# Patient Record
Sex: FEMALE | Race: WHITE | URBAN - METROPOLITAN AREA
[De-identification: names, ages, dates, MRNs, and addresses within clinical notes are randomized per-mention and may not be internally consistent; named-entity substitution may affect disease eponyms.]

---

## 2018-07-20 ENCOUNTER — OFFICE VISIT (OUTPATIENT)
Dept: FAMILY MEDICINE CLINIC | Age: 48
End: 2018-07-20

## 2018-07-20 VITALS
WEIGHT: 123.13 LBS | RESPIRATION RATE: 16 BRPM | BODY MASS INDEX: 19.33 KG/M2 | HEART RATE: 81 BPM | HEIGHT: 67 IN | SYSTOLIC BLOOD PRESSURE: 98 MMHG | TEMPERATURE: 97.7 F | OXYGEN SATURATION: 98 % | DIASTOLIC BLOOD PRESSURE: 62 MMHG

## 2018-07-20 DIAGNOSIS — L71.9 ROSACEA: Primary | ICD-10-CM

## 2018-07-20 RX ORDER — CLINDAMYCIN PHOSPHATE 11.9 MG/ML
SOLUTION TOPICAL
Qty: 60 ML | Refills: 2 | Status: SHIPPED | OUTPATIENT
Start: 2018-07-20 | End: 2022-10-28

## 2018-07-20 NOTE — PROGRESS NOTES
1. Have you been to the ER, urgent care clinic since your last visit? Hospitalized since your last visit? No    2. Have you seen or consulted any other health care providers outside of the [address] [place] [address] since your last visit? Include any pap smears or colon screening. No    Chief Complaint   Patient presents with    Establish Care     re-establish care    Rash     on face x 4 mo     Pt states she has a rash on her face x 4 mo.

## 2018-07-20 NOTE — MR AVS SNAPSHOT
[address] [address] 
[phone] Patient: Mayte Melvin MRN: [id number] [id number] Visit Information Date & Time Provider Department Dept. Phone Encounter #  
 7/20/2018  2:00 PM Kiesha Sanchez, [address] [phone] [id number] Upcoming Health Maintenance Date Due DTaP/Tdap/Td series (1 - Tdap) 3/29/1991 PAP AKA CERVICAL CYTOLOGY 3/29/1991 Influenza Age 5 to Adult 8/1/2018 Allergies as of 7/20/2018  Review Complete On: 7/20/2018 By: Marquis Dawkins LPN No Known Allergies Current Immunizations  Never Reviewed No immunizations on file. Not reviewed this visit You Were Diagnosed With   
  
 Codes Comments Rosacea    -  Primary ICD-10-CM: L71.9 ICD-9-CM: 695.3 Vitals BP Pulse Temp Resp Height(growth percentile) Weight(growth percentile) 98/62 81 97.7 °F (36.5 °C) (Oral) 16 5' 7\" (1.702 m) 123 lb 2 oz (55.8 kg) LMP SpO2 BMI OB Status Smoking Status 07/06/2018 (Approximate) 98% 19.28 kg/m2 Having regular periods Former Smoker Vitals History BMI and BSA Data Body Mass Index Body Surface Area  
 19.28 kg/m 2 1.62 m 2 Preferred Pharmacy Pharmacy Name Phone [id number], [address] [facility] [phone] Your Updated Medication List  
  
   
This list is accurate as of 7/20/18  2:35 PM.  Always use your most recent med list.  
  
  
  
  
 clindamycin 1 % external solution Commonly known as:  CLEOCIN T  
use thin film on affected area Prescriptions Sent to Pharmacy Refills  
 clindamycin (CLEOCIN T) 1 % external solution 2 Sig: use thin film on affected area Class: Normal  
 Pharmacy: [facility] [address], [address] Ph #: [phone] Introducing [facility]!    
 Niurka Schmidt introduces [employer] patient portal. Now you can access parts of your medical record, email your doctor's office, and request medication refills online. 1. In your internet browser, go to [email or web address] 2. Click on the First Time User? Click Here link in the Sign In box. You will see the New Member Sign Up page. 3. Enter your Workspace Access Code exactly as it appears below. You will not need to use this code after youve completed the sign-up process. If you do not sign up before the expiration date, you must request a new code. · Workspace Access Code: [email or web address] Expires: 10/18/2018  2:35 PM 
 
4. Enter the last four digits of your Social Security Number (xxxx) and Date of Birth (mm/dd/yyyy) as indicated and click Submit. You will be taken to the next sign-up page. 5. Create a Workspace ID. This will be your Workspace login ID and cannot be changed, so think of one that is secure and easy to remember. 6. Create a Workspace password. You can change your password at any time. 7. Enter your Password Reset Question and Answer. This can be used at a later time if you forget your password. 8. Enter your e-mail address. You will receive e-mail notification when new information is available in [address] 9. Click Sign Up. You can now view and download portions of your medical record. 10. Click the Download Summary menu link to download a portable copy of your medical information. If you have questions, please visit the Frequently Asked Questions section of the Workspace website. Remember, Workspace is NOT to be used for urgent needs. For medical emergencies, dial 911. Now available from your iPhone and Android! Please provide this summary of care documentation to your next provider. If you have any questions after today's visit, please call [phone].

## 2018-07-26 NOTE — PROGRESS NOTES
HISTORY OF PRESENT ILLNESS  Rain Steiner is a 50 y.o. female. HPI  Patient complains of red rash across the nose and cheeks  No change in lotions/moisturizers  Irritated and burns at times  The FamHx, SocHx, MedHx, Medication, and Allergy lists have been reviewed and updated in the chart. ROS  A comprehensive review of system was obtained and negative except findings in the HPI    Visit Vitals    BP 98/62    Pulse 81    Temp 97.7 °F (36.5 °C) (Oral)    Resp 16    Ht 5' 7\" (1.702 m)    Wt 123 lb 2 oz (55.8 kg)    LMP 07/06/2018 (Approximate)    SpO2 98%    BMI 19.28 kg/m2     Physical Exam   Constitutional: She is oriented to person, place, and time. She appears well-developed and well-nourished. Neck: No JVD present. Cardiovascular: Normal rate, regular rhythm and intact distal pulses. Exam reveals no gallop and no friction rub. No murmur heard. Pulmonary/Chest: Effort normal and breath sounds normal. No respiratory distress. She has no wheezes. Musculoskeletal: She exhibits no edema. Neurological: She is alert and oriented to person, place, and time. Skin: Skin is warm. Rash noted. Rosacea type rash of the face across nose and cheeks louie   Nursing note and vitals reviewed. ASSESSMENT and PLAN  Encounter Diagnoses   Name Primary?  Rosacea Yes     Orders Placed This Encounter    clindamycin (CLEOCIN T) 1 % external solution     Given cleocin 1% to use at night to face every other day and gradually increase use  Reviewed adr/se of med  Recheck 6 weeks    I have discussed the diagnosis with the patient and the intended plan as seen in the above orders. The patient has received an after-visit summary and questions were answered concerning future plans. Patient conveyed understanding of the plan at the time of the visit.     Hoda Greene, MSN, ANP  7/26/2018

## 2022-10-28 ENCOUNTER — OFFICE VISIT (OUTPATIENT)
Dept: FAMILY MEDICINE CLINIC | Age: 52
End: 2022-10-28
Payer: COMMERCIAL

## 2022-10-28 VITALS
TEMPERATURE: 98.3 F | SYSTOLIC BLOOD PRESSURE: 127 MMHG | HEIGHT: 67 IN | OXYGEN SATURATION: 98 % | BODY MASS INDEX: 20.37 KG/M2 | WEIGHT: 129.8 LBS | DIASTOLIC BLOOD PRESSURE: 74 MMHG | HEART RATE: 67 BPM

## 2022-10-28 DIAGNOSIS — Z11.59 ENCOUNTER FOR HEPATITIS C SCREENING TEST FOR LOW RISK PATIENT: ICD-10-CM

## 2022-10-28 DIAGNOSIS — L71.9 ROSACEA: Primary | ICD-10-CM

## 2022-10-28 DIAGNOSIS — Z83.49 FAMILY HISTORY OF THYROID DISEASE: ICD-10-CM

## 2022-10-28 LAB
ALBUMIN SERPL-MCNC: 3.9 G/DL (ref 3.5–5)
ALBUMIN/GLOB SERPL: 1.3 {RATIO} (ref 1.1–2.2)
ALP SERPL-CCNC: 76 U/L (ref 45–117)
ALT SERPL-CCNC: 19 U/L (ref 12–78)
ANION GAP SERPL CALC-SCNC: 3 MMOL/L (ref 5–15)
AST SERPL-CCNC: 11 U/L (ref 15–37)
BILIRUB SERPL-MCNC: 0.4 MG/DL (ref 0.2–1)
BUN SERPL-MCNC: 11 MG/DL (ref 6–20)
BUN/CREAT SERPL: 15 (ref 12–20)
CALCIUM SERPL-MCNC: 9.1 MG/DL (ref 8.5–10.1)
CHLORIDE SERPL-SCNC: 106 MMOL/L (ref 97–108)
CO2 SERPL-SCNC: 30 MMOL/L (ref 21–32)
CREAT SERPL-MCNC: 0.74 MG/DL (ref 0.55–1.02)
ERYTHROCYTE [DISTWIDTH] IN BLOOD BY AUTOMATED COUNT: 12.2 % (ref 11.5–14.5)
GLOBULIN SER CALC-MCNC: 2.9 G/DL (ref 2–4)
GLUCOSE SERPL-MCNC: 92 MG/DL (ref 65–100)
HCT VFR BLD AUTO: 44.6 % (ref 35–47)
HCV AB SERPL QL IA: NONREACTIVE
HGB BLD-MCNC: 14.6 G/DL (ref 11.5–16)
MCH RBC QN AUTO: 32 PG (ref 26–34)
MCHC RBC AUTO-ENTMCNC: 32.7 G/DL (ref 30–36.5)
MCV RBC AUTO: 97.8 FL (ref 80–99)
NRBC # BLD: 0 K/UL (ref 0–0.01)
NRBC BLD-RTO: 0 PER 100 WBC
PLATELET # BLD AUTO: 245 K/UL (ref 150–400)
PMV BLD AUTO: 10.5 FL (ref 8.9–12.9)
POTASSIUM SERPL-SCNC: 4.3 MMOL/L (ref 3.5–5.1)
PROT SERPL-MCNC: 6.8 G/DL (ref 6.4–8.2)
RBC # BLD AUTO: 4.56 M/UL (ref 3.8–5.2)
SODIUM SERPL-SCNC: 139 MMOL/L (ref 136–145)
TSH SERPL DL<=0.05 MIU/L-ACNC: 0.91 UIU/ML (ref 0.36–3.74)
WBC # BLD AUTO: 6.5 K/UL (ref 3.6–11)

## 2022-10-28 PROCEDURE — 99203 OFFICE O/P NEW LOW 30 MIN: CPT | Performed by: STUDENT IN AN ORGANIZED HEALTH CARE EDUCATION/TRAINING PROGRAM

## 2022-10-28 RX ORDER — CLINDAMYCIN PHOSPHATE 11.9 MG/ML
SOLUTION TOPICAL
Qty: 60 ML | Refills: 2 | Status: SHIPPED | OUTPATIENT
Start: 2022-10-28

## 2022-10-28 RX ORDER — METRONIDAZOLE 7.5 MG/G
CREAM TOPICAL 2 TIMES DAILY
COMMUNITY
End: 2022-10-28 | Stop reason: SDUPTHER

## 2022-10-28 RX ORDER — PIMECROLIMUS 10 MG/G
CREAM TOPICAL 2 TIMES DAILY
COMMUNITY
End: 2022-10-28 | Stop reason: SDUPTHER

## 2022-10-28 RX ORDER — PIMECROLIMUS 10 MG/G
CREAM TOPICAL 2 TIMES DAILY
Qty: 30 G | Refills: 0 | Status: SHIPPED | OUTPATIENT
Start: 2022-10-28

## 2022-10-28 RX ORDER — METRONIDAZOLE 7.5 MG/G
CREAM TOPICAL 2 TIMES DAILY
Qty: 45 G | Refills: 0 | Status: SHIPPED | OUTPATIENT
Start: 2022-10-28

## 2022-10-28 NOTE — PROGRESS NOTES
Jameson Beltre is a 46 y.o. female , id x 2(name and ). Reviewed record, history, and  medications. Chief Complaint   Patient presents with    New Patient     Re-est care  Family hx of Thyroid disease wants to keep monitored     Medication Refill       Vitals:    10/28/22 0949   BP: 127/74   Pulse: 67   Temp: 98.3 °F (36.8 °C)   SpO2: 98%   Weight: 129 lb 12.8 oz (58.9 kg)   Height: 5' 7\" (1.702 m)       Coordination of Care Questionnaire:   1. Have you been to the ER, urgent care clinic since your last visit? Hospitalized since your last visit? No    2. Have you seen or consulted any other health care providers outside of the [address] [place] [address] since your last visit? Include any pap smears or colon screening. No      3 most recent PHQ Screens 10/28/2022   Little interest or pleasure in doing things Not at all   Feeling down, depressed, irritable, or hopeless Not at all   Total Score PHQ 2 0       Patient is accompanied by self I have received verbal consent from Jameson Beltre to discuss any/all medical information while they are present in the room.

## 2022-10-28 NOTE — PROGRESS NOTES
Progress Note    she is a 46y.o. year old female who presents for evalution. Assessment/ Plan:   Diagnoses and all orders for this visit:    1. Rosacea  -     clindamycin (CLEOCIN T) 1 % external solution; use thin film on affected area  -     metroNIDAZOLE (METROCREAM) 0.75 % topical cream; Apply  to affected area two (2) times a day. Use a thin layer to affected areas after washing  -     pimecrolimus (ELIDEL) 1 % topical cream; Apply  to affected area two (2) times a day. -     METABOLIC PANEL, COMPREHENSIVE; Future  -     CBC W/O DIFF; Future    2. Family history of thyroid disease  -     TSH 3RD GENERATION; Future    3. Encounter for hepatitis C screening test for low risk patient  -     HEPATITIS C AB; Future         Follow-up and Dispositions    Return for annual well visit, your convenience; other follow-up based on labs. I have discussed the diagnosis with the patient and the intended plan as seen in the above orders. The patient has received an after-visit summary and questions were answered concerning future plans. Pt conveyed understanding of plan. Medication Side Effects and Warnings were discussed with patient        Subjective:     Chief Complaint   Patient presents with    New Patient     Re-est care  Family hx of Thyroid disease wants to keep monitored     Medication Refill     She works in the dental field   They are screening patients. Questions about COVID testing. Dr. Jose Serra, chiropractor      Reviewed PmHx, RxHx, [facility], SocHx, AllgHx and updated and dated in the chart.     Review of Systems - negative except as listed above in the HPI    Objective:     Vitals:    10/28/22 0949   BP: 127/74   Pulse: 67   Temp: 98.3 °F (36.8 °C)   SpO2: 98%   Weight: 129 lb 12.8 oz (58.9 kg)   Height: 5' 7\" (1.702 m)       Current Outpatient Medications   Medication Sig    clindamycin (CLEOCIN T) 1 % external solution use thin film on affected area    metroNIDAZOLE (METROCREAM) 0.75 % topical cream Apply  to affected area two (2) times a day. Use a thin layer to affected areas after washing    pimecrolimus (ELIDEL) 1 % topical cream Apply  to affected area two (2) times a day. No current facility-administered medications for this visit. Physical Exam  Vitals and nursing note reviewed. Constitutional:       General: She is not in acute distress. Appearance: Normal appearance. She is not ill-appearing, toxic-appearing or diaphoretic. HENT:      Head: Normocephalic and atraumatic. Eyes:      General: No scleral icterus. Right eye: No discharge. Left eye: No discharge. Conjunctiva/sclera: Conjunctivae normal.   Cardiovascular:      Rate and Rhythm: Normal rate and regular rhythm. Pulses: Normal pulses. Heart sounds: Normal heart sounds. Pulmonary:      Effort: Pulmonary effort is normal. No respiratory distress. Breath sounds: Normal breath sounds. Musculoskeletal:         General: No tenderness. Cervical back: No rigidity. Right lower leg: No edema. Left lower leg: No edema. Skin:     General: Skin is warm and dry. Neurological:      General: No focal deficit present. Mental Status: She is alert. Psychiatric:         Mood and Affect: Mood normal.         Behavior: Behavior normal.         Thought Content:  Thought content normal.         Judgment: Judgment normal.            Mervat Mohr MD

## 2022-10-28 NOTE — PATIENT INSTRUCTIONS
Next is the lab next door, schedule covid tests online if needed. Communicate via [email or web address]. See your dermatologist for further refills of the medications.     Make sure any specialists you see forward records to [facility]  Fax: [phone]    Check with your mom what kind of arthritis it is and if she has weak bones: osteopenia or osteoporosis

## 2022-10-31 PROBLEM — Z83.49 FAMILY HISTORY OF THYROID DISEASE: Status: ACTIVE | Noted: 2022-10-31
